# Patient Record
Sex: FEMALE | Race: WHITE | NOT HISPANIC OR LATINO
[De-identification: names, ages, dates, MRNs, and addresses within clinical notes are randomized per-mention and may not be internally consistent; named-entity substitution may affect disease eponyms.]

---

## 2018-03-22 ENCOUNTER — TRANSCRIPTION ENCOUNTER (OUTPATIENT)
Age: 48
End: 2018-03-22

## 2018-07-30 ENCOUNTER — TRANSCRIPTION ENCOUNTER (OUTPATIENT)
Age: 48
End: 2018-07-30

## 2019-07-09 PROBLEM — Z00.00 ENCOUNTER FOR PREVENTIVE HEALTH EXAMINATION: Status: ACTIVE | Noted: 2019-07-09

## 2019-07-19 ENCOUNTER — TRANSCRIPTION ENCOUNTER (OUTPATIENT)
Age: 49
End: 2019-07-19

## 2019-08-06 ENCOUNTER — RESULT REVIEW (OUTPATIENT)
Age: 49
End: 2019-08-06

## 2019-08-06 ENCOUNTER — APPOINTMENT (OUTPATIENT)
Dept: SURGERY | Facility: CLINIC | Age: 49
End: 2019-08-06

## 2019-08-08 ENCOUNTER — APPOINTMENT (OUTPATIENT)
Dept: GASTROENTEROLOGY | Facility: CLINIC | Age: 49
End: 2019-08-08

## 2021-04-16 ENCOUNTER — RESULT REVIEW (OUTPATIENT)
Age: 51
End: 2021-04-16

## 2021-04-26 ENCOUNTER — RESULT REVIEW (OUTPATIENT)
Age: 51
End: 2021-04-26

## 2021-10-12 ENCOUNTER — RESULT REVIEW (OUTPATIENT)
Age: 51
End: 2021-10-12

## 2022-04-21 ENCOUNTER — APPOINTMENT (OUTPATIENT)
Dept: ORTHOPEDIC SURGERY | Facility: CLINIC | Age: 52
End: 2022-04-21

## 2022-05-12 ENCOUNTER — TRANSCRIPTION ENCOUNTER (OUTPATIENT)
Age: 52
End: 2022-05-12

## 2022-05-12 ENCOUNTER — APPOINTMENT (OUTPATIENT)
Dept: ORTHOPEDIC SURGERY | Facility: CLINIC | Age: 52
End: 2022-05-12
Payer: OTHER MISCELLANEOUS

## 2022-05-12 VITALS — BODY MASS INDEX: 27.32 KG/M2 | HEIGHT: 66 IN | WEIGHT: 170 LBS

## 2022-05-12 DIAGNOSIS — Z78.9 OTHER SPECIFIED HEALTH STATUS: ICD-10-CM

## 2022-05-12 DIAGNOSIS — Z87.19 PERSONAL HISTORY OF OTHER DISEASES OF THE DIGESTIVE SYSTEM: ICD-10-CM

## 2022-05-12 DIAGNOSIS — Z86.39 PERSONAL HISTORY OF OTHER ENDOCRINE, NUTRITIONAL AND METABOLIC DISEASE: ICD-10-CM

## 2022-05-12 PROCEDURE — 99455 WORK RELATED DISABILITY EXAM: CPT

## 2022-05-12 PROCEDURE — 99072 ADDL SUPL MATRL&STAF TM PHE: CPT

## 2022-05-14 PROBLEM — Z78.9 NON-SMOKER: Status: ACTIVE | Noted: 2022-05-12

## 2022-05-14 PROBLEM — Z87.19 HISTORY OF GASTROESOPHAGEAL REFLUX (GERD): Status: RESOLVED | Noted: 2022-05-12 | Resolved: 2022-05-14

## 2022-05-14 PROBLEM — Z86.39 HISTORY OF HYPERGLYCEMIA: Status: RESOLVED | Noted: 2022-05-12 | Resolved: 2022-05-14

## 2022-05-14 NOTE — IMAGING
[de-identified] : The patient is a well appearing 51 year old female of their stated age.\par Patient ambulates with a normal gait.\par Negative straight leg raise bilateral\par \par Effected Knee: right \par ROM: 0-120 degrees Goniometer measured 3x\par Lachman: Negative\par Pivot Shift: Negative\par Anterior Drawer: Negative\par Posterior Drawer / Sag:Negative\par Varus Stress 0 degrees: Stable\par Varus Stress 30 degrees: Stable\par Valgus Stress 0 degrees: Stable\par Valgus Stress 30 degrees: Stable\par Medial Amaury: Negative\par Lateral Amaury: Negative\par Patella Glide: 2+\par Patella Apprehension: Negative\par Patella Grind: +\par Palpation:\par Medial Joint Line: tender\par Lateral Joint Line: tender\par Medial Collateral Ligament: Nontender\par Lateral Collateral Ligament/PLC: Nontender\par Distal Femur: Nontender\par Proximal Tibia: Nontender\par Tibial Tubercle: Nontender\par Distal Pole Patella: Nontender\par Quadriceps Tendon: Nontender & Intact\par Patella Tendon: Nontender & Intact\par Medial Distal Hamstring/PES: Nontender\par Lateral Distal Hamstring: Nontender & Stable\par Iliotibial Band: Nontender\par Medial Patellofemoral Ligament: Nontender\par Proximal GSC-Plantaris: Nontender\par Calf: Supple & Nontender\par Inspection:\par Deformity: No\par Erythema: No\par Ecchymosis: No\par Abrasions: No\par Effusion: No\par Prepatella Bursitis: No\par Neurologic Exam:\par Sensation L4-S1: Grossly Intact\par Motor Exam:\par Quadriceps: 5 out of 5\par Hamstrings: 5 out of 5\par EHL: 5 out of 5\par FHL: 5 out of 5\par TA: 5 out of 5\par GS: 5 out of 5\par Circulatory/Pulses:\par Dorsalis Pedis: 2+\par Posterior Tibialis: 2+\par Additional Pertinent Findings: None\par \par \par Effected Knee: left \par ROM: 0-110 degrees Goniometer measured 3x\par Lachman: Negative\par Pivot Shift: Negative\par Anterior Drawer: Negative\par Posterior Drawer / Sag:Negative\par Varus Stress 0 degrees: Stable\par Varus Stress 30 degrees: Stable\par Valgus Stress 0 degrees: Stable\par Valgus Stress 30 degrees: Stable\par Medial Amaury: Negative\par Lateral Amaury: Negative\par Patella Glide: 2+\par Patella Apprehension: Negative\par Patella Grind: +\par Palpation:\par Medial Joint Line: tender\par Lateral Joint Line: tender\par Medial Collateral Ligament: Nontender\par Lateral Collateral Ligament/PLC: Nontender\par Distal Femur: Nontender\par Proximal Tibia: Nontender\par Tibial Tubercle: Nontender\par Distal Pole Patella: Nontender\par Quadriceps Tendon: Nontender & Intact\par Patella Tendon: Nontender & Intact\par Medial Distal Hamstring/PES: Nontender\par Lateral Distal Hamstring: Nontender & Stable\par Iliotibial Band: Nontender\par Medial Patellofemoral Ligament: Nontender\par Proximal GSC-Plantaris: Nontender\par Calf: Supple & Nontender\par Inspection:\par Deformity: No\par Erythema: No\par Ecchymosis: No\par Abrasions: No\par Effusion: No\par Prepatella Bursitis: No\par Neurologic Exam:\par Sensation L4-S1: Grossly Intact\par Motor Exam:\par Quadriceps: 4 out of 5\par Hamstrings: 5 out of 5\par EHL: 5 out of 5\par FHL: 5 out of 5\par TA: 5 out of 5\par GS: 5 out of 5\par Circulatory/Pulses:\par Dorsalis Pedis: 2+\par Posterior Tibialis: 2+\par Additional Pertinent Findings: None\par   \par \par Assessment: The patient is a 51 year old with b/l knee OA\par \par Patients condition is chronic\par Pertinent findings include: Right knee: 0-120, crepitus, +PF grind, MJLT, LJLT, 5/5 quad -- Left knee: 0-110, crepitus, +PF grind, MJLT, LJLT, 4/5 quad\par Confounding medical conditions/concerns: GERD, pure hyperglyceridemia\par \par Plan: Patient has reached maximum medical benefit and based on the [place] 2018 WC guidelines, schedule loss of use is: Right knee 15%, and left knee 20%.\par Activity/Work/Sports Status: Patient remains out of work\par Additional Instructions: Will continue HEP and ice.\par Follow-Up: prn\par \par The patient's current medication management of their orthopedic diagnosis was reviewed today:\par (1) We discussed a comprehensive treatment plan that included possible pharmaceutical management involving the use of prescription strength medications including but not limited to options such as oral Naprosyn 500mg BID, once daily Meloxicam 15 mg, or 500-650 mg Tylenol versus over the counter oral medications and topical prescription NSAID Pennsaid vs over the counter Voltaren gel.\par (2) There is a moderate risk of morbidity with further treatment, especially from use of prescription strength medications and possible side effects of these medications which include upset stomach with oral medications, skin reactions to topical medications and cardiac/renal issues with long term use.\par (3) I recommended that the patient follow-up with their medical physician to discuss any significant specific potential issues with long term medication use such as interactions with current medications or with exacerbation of underlying medical comorbidities.\par (4) The benefits and risks associated with use of injectable, oral or topical, prescription and over the counter anti-inflammatory medications were discussed with the patient. The patient voiced understanding of the risks including but not limited to bleeding, stroke, kidney dysfunction, heart disease, and were referred to the black box warning label for further information.\par \par I, Hank Wild, attest that this documentation has been prepared under the direction and in the presence of Provider Dr. Guthrie\par \par The documentation recorded by the scribe accurately reflects the service I personally performed and the decisions made by me.\par

## 2022-05-14 NOTE — WORK
[Has the patient reached Maximum Medical Improvement? If yes, indicate date___] : Yes, on [unfilled] [Is there permanent partial impairment?] : Yes [FreeTextEntry6] : Bilateral Knees [Right] : right [Left] : left [FreeTextEntry7] : Knee [FreeTextEntry8] : 0-120 [de-identified] : OA [FreeTextEntry5] : 15% [de-identified] : Loss of motion and post traumatic OA [de-identified] : Knee [de-identified] : 0-110 [de-identified] : OA [de-identified] : 20% [de-identified] : Loss of motion and post traumatic OA [Not working] : Not working [___lbs] : Occasionally: [unfilled] lbs [] : Constantly [Light Work] : Light work [N/A] : N/A [Would the patient benefit from vocational rehabilitation? If Yes, explain below.] :  No

## 2022-05-14 NOTE — HISTORY OF PRESENT ILLNESS
[de-identified] : The patient is a 50 year old Right hand dominant female who presents today complaining of both knees and scheduled loss of use. \par Date of Injury/Onset: 4/11/19\par Pain: At Rest: 5/10 \par With Activity: 7/10 \par Mechanism of injury: Fell at work.\par This is a Work Related Injury being treated under Worker's Compensation.\par This is not an athletic injury occurring associated with an interscholastic or organized sports team.\par Quality of symptoms: Sharp pain, beginning to notice back pain, knee locks up.\par Improves with: Naproxen, ice \par Worse with: Walking, stairs, increased activity, bending down. \par Treatment/Imaging/Studies Since Last Visit: Home exercises. \par 	Reports Available For Review Today: None \par Out of work/sport: Yes, since 4/11/2019\par School/Sport/Position/Occupation: [employer] \par Change since last visit: Patient states she has not experienced any improvement since last visit.

## 2022-05-15 ENCOUNTER — RESULT REVIEW (OUTPATIENT)
Age: 52
End: 2022-05-15

## 2022-05-16 ENCOUNTER — APPOINTMENT (OUTPATIENT)
Dept: OBGYN | Facility: CLINIC | Age: 52
End: 2022-05-16
Payer: SELF-PAY

## 2022-05-16 PROCEDURE — 99396 PREV VISIT EST AGE 40-64: CPT | Mod: 25

## 2022-05-16 PROCEDURE — 81002 URINALYSIS NONAUTO W/O SCOPE: CPT

## 2022-05-16 PROCEDURE — 82270 OCCULT BLOOD FECES: CPT

## 2022-05-16 PROCEDURE — 99072 ADDL SUPL MATRL&STAF TM PHE: CPT

## 2022-12-30 ENCOUNTER — APPOINTMENT (OUTPATIENT)
Dept: ORTHOPEDIC SURGERY | Facility: CLINIC | Age: 52
End: 2022-12-30
Payer: OTHER MISCELLANEOUS

## 2022-12-30 VITALS — BODY MASS INDEX: 27.32 KG/M2 | HEIGHT: 66 IN | WEIGHT: 170 LBS

## 2022-12-30 PROCEDURE — 73564 X-RAY EXAM KNEE 4 OR MORE: CPT | Mod: RT

## 2022-12-30 PROCEDURE — 99214 OFFICE O/P EST MOD 30 MIN: CPT

## 2022-12-30 PROCEDURE — 99072 ADDL SUPL MATRL&STAF TM PHE: CPT

## 2023-01-12 ENCOUNTER — APPOINTMENT (OUTPATIENT)
Dept: ORTHOPEDIC SURGERY | Facility: CLINIC | Age: 53
End: 2023-01-12

## 2023-01-13 ENCOUNTER — APPOINTMENT (OUTPATIENT)
Dept: ORTHOPEDIC SURGERY | Facility: CLINIC | Age: 53
End: 2023-01-13
Payer: OTHER MISCELLANEOUS

## 2023-01-13 VITALS — HEIGHT: 66 IN | WEIGHT: 170 LBS | BODY MASS INDEX: 27.32 KG/M2

## 2023-01-13 DIAGNOSIS — M17.31 UNILATERAL POST-TRAUMATIC OSTEOARTHRITIS, RIGHT KNEE: ICD-10-CM

## 2023-01-13 DIAGNOSIS — M17.0 BILATERAL PRIMARY OSTEOARTHRITIS OF KNEE: ICD-10-CM

## 2023-01-13 DIAGNOSIS — M25.561 PAIN IN RIGHT KNEE: ICD-10-CM

## 2023-01-13 PROCEDURE — 99072 ADDL SUPL MATRL&STAF TM PHE: CPT | Mod: NC

## 2023-01-13 PROCEDURE — 99214 OFFICE O/P EST MOD 30 MIN: CPT | Mod: 25

## 2023-01-13 PROCEDURE — 20611 DRAIN/INJ JOINT/BURSA W/US: CPT | Mod: RT

## 2023-02-01 ENCOUNTER — RX RENEWAL (OUTPATIENT)
Age: 53
End: 2023-02-01

## 2023-02-01 RX ORDER — NAPROXEN 500 MG/1
500 TABLET ORAL
Qty: 60 | Refills: 0 | Status: ACTIVE | COMMUNITY
Start: 2022-12-30 | End: 1900-01-01

## 2023-03-08 ENCOUNTER — FORM ENCOUNTER (OUTPATIENT)
Age: 53
End: 2023-03-08

## 2023-04-27 NOTE — IMAGING
[Right] : right knee [All Views] : anteroposterior, lateral, skyline, and anteroposterior standing [FreeTextEntry9] :  X-Ray right knee reveals reveals evidence of tricompartment OA most severe with significant joint space narrowing on the lateral aspect  [de-identified] : The patient is a well appearing 51 year old female of their stated age.\par Patient ambulates with a normal gait.\par Negative straight leg raise bilateral\par \par Effected Knee: RIGHT \par ROM: 0-120 degrees Goniometer measured 3x\par Lachman: Negative\par Pivot Shift: Negative\par Anterior Drawer: Negative\par Posterior Drawer / Sag:Negative\par Varus Stress 0 degrees: Stable\par Varus Stress 30 degrees: Stable\par Valgus Stress 0 degrees: Stable\par Valgus Stress 30 degrees: Stable\par Medial Amaury: Negative\par Lateral Amaury: Negative\par Patella Glide: 2+\par Patella Apprehension: Negative\par Patella Grind: +\par Palpation:\par Medial Joint Line: tender\par Lateral Joint Line: tender\par Medial Collateral Ligament: Nontender\par Lateral Collateral Ligament/PLC: Nontender\par Distal Femur: Nontender\par Proximal Tibia: Nontender\par Tibial Tubercle: Nontender\par Distal Pole Patella: Nontender\par Quadriceps Tendon: Nontender & Intact\par Patella Tendon: Nontender & Intact\par Medial Distal Hamstring/PES: Nontender\par Lateral Distal Hamstring: Nontender & Stable\par Iliotibial Band: Nontender\par Medial Patellofemoral Ligament: Nontender\par Proximal GSC-Plantaris: Nontender\par Calf: Supple & Nontender\par Inspection:\par Deformity: No\par Erythema: No\par Ecchymosis: No\par Abrasions: No\par Effusion: No\par Prepatella Bursitis: No\par Neurologic Exam:\par Sensation L4-S1: Grossly Intact\par Motor Exam:\par Quadriceps: 5 out of 5\par Hamstrings: 5 out of 5\par EHL: 5 out of 5\par FHL: 5 out of 5\par TA: 5 out of 5\par GS: 5 out of 5\par Circulatory/Pulses:\par Dorsalis Pedis: 2+\par Posterior Tibialis: 2+\par Additional Pertinent Findings: None\par   \par Assessment: The patient is a 51 year old with with right knee pain and radiographic and physical exam findings consistent with post traumatic OA. \par \par The patient’s condition is chronic \par Documents/Results Reviewed Today: None \par Tests/Studies Independently Interpreted Today: None\par Pertinent findings include: Right knee: 0-120, crepitus, +PF grind, MJLT, LJLT, 5/5 quad \par Confounding medical conditions/concerns: GERD, pure hyperglyceridemia\par \par Plan: Discussed conservative treatments in the form of injections. Patient received right knee GelOne injection today. Advised patient to obtain Reparel knee sleeve.  Modify activity as discussed. Patient is not working at this time. \par Tests Ordered: None \par Prescription Medications Ordered: None\par Braces/DME Ordered: None\par Activity/Work/Sports: N/a\par Additional Instructions: none\par Follow-Up: 6 months \par \par Procedure Note: Musculoskeletal Injection   \par Diagnosis: right knee OA \par Procedure: right knee, superolateral, Gel One\par \par Indication:  The patient has had persistent pain despite conservative treatment.  Risks, benefits and alternatives to procedure were discussed; all questions were answered to the patient's apparent satisfaction and informed consent obtained.  Consent form was signed and dated today.  The patient denied prior problems with local anesthetics, injectable cortisones, chicken allergy, coagulopathy and no relevant drug or preservative allergies or sensitivities. \par The area of injection was prepared in a sterile fashion.  Prior to injection a 'Time Out' was conducted in accordance with University policy and the site and nature of procedure verified with the patient. \par   \par Procedure: \par The injection and aspiration was carried out utilizing sterile technique from a superolateral arthroscopic portal position with needle placement under ultrasound guidance to improve accuracy and minimize risk to the patient and: \par  \par (X) Diagnositic ultrasound in the long and short axis revealed right knee OA\par \par 12cc of clear synovial fluid was aspirated. \par The specimen:\par (X) appeared benign and was discarded \par ( ) was sent for Culture / Cell Count / Crystal analysis / [_]. \par   \par Injection into the target area with care taken to aspirate frequently to minimize the risk of intravascular injection was performed with: \par   \par ( ) 1cc of Depomedrol (80mg/ml) \par ( ) 1cc of Dexamethasone (10mg/ml) \par ( ) 1cc of Toradol (30mg/ml) \par ( ) 9cc of 0.5% Bupivacaine \par (X) 1cc of 1% Lidocaine \par ( ) 5cc of 32mg Zilretta, prepared and diluted per  instructions \par ( ) 2 cc of Hylan G-F 20 (Synvisc) 16mg/2ml \par ( ) 6 cc of Hylan G-F 20 (SynvisoOne) 16mg/2ml \par ( ) 2cc of Euflexxa \par ( ) 2cc of Orthovisc \par (X) 2cc of GelOne \par ( ) 3cc of Durolane (20mg/ml) \par \par \par Patient tolerated the procedure well and direct pressure was applied for hemostasis. The patient was reminded of potential post-injection risks including, but not limited to, delayed hypersensitivity reactions and/or infection.  The patient verified that they had the office and the Emergency Room's contact information if any problems should arise.  After several minutes, the patient informed me that they felt fine and was released from the office.\par \par \par The patient's current medication management of their orthopedic diagnosis was reviewed today:\par (1) We discussed a comprehensive treatment plan that included possible pharmaceutical management involving the use of prescription strength medications including but not limited to options such as oral Naprosyn 500mg BID, once daily Meloxicam 15 mg, or 500-650 mg Tylenol versus over the counter oral medications and topical prescription NSAID Pennsaid vs over the counter Voltaren gel.\par (2) There is a moderate risk of morbidity with further treatment, especially from use of prescription strength medications and possible side effects of these medications which include upset stomach with oral medications, skin reactions to topical medications and cardiac/renal issues with long term use.\par (3) I recommended that the patient follow-up with their medical physician to discuss any significant specific potential issues with long term medication use such as interactions with current medications or with exacerbation of underlying medical comorbidities.\par (4) The benefits and risks associated with use of injectable, oral or topical, prescription and over the counter anti-inflammatory medications were discussed with the patient. The patient voiced understanding of the risks including but not limited to bleeding, stroke, kidney dysfunction, heart disease, and were referred to the black box warning label for further information.\par \par I, January Rivera, attest that this documentation has been prepared under the direction and in the presence of Provider Dr. Cole Guthrie.\par \par \par The documentation recorded by the scribe accurately reflects the service I personally performed and the decisions made by me.\par The patient was seen by me under the direct supervision of Dr. Cole Guthrie\par

## 2023-04-27 NOTE — WORK
[Other: ___] : [unfilled] [Was the competent medical cause of the injury] : was the competent medical cause of the injury [Are consistent with the injury] : are consistent with the injury [Consistent with my objective findings] : consistent with my objective findings [Total] : total [Does not reveal pre-existing condition(s) that may affect treatment/prognosis] : does not reveal pre-existing condition(s) that may affect treatment/prognosis [Patient] : patient [No Rx restrictions] : No Rx restrictions. [I provided the services listed above] :  I provided the services listed above.

## 2023-04-27 NOTE — IMAGING
[Right] : right knee [All Views] : anteroposterior, lateral, skyline, and anteroposterior standing [de-identified] : The patient is a well appearing 51 year old female of their stated age.\par Patient ambulates with a normal gait.\par Negative straight leg raise bilateral\par \par Effected Knee: RIGHT \par ROM: 0-120 degrees Goniometer measured 3x\par Lachman: Negative\par Pivot Shift: Negative\par Anterior Drawer: Negative\par Posterior Drawer / Sag:Negative\par Varus Stress 0 degrees: Stable\par Varus Stress 30 degrees: Stable\par Valgus Stress 0 degrees: Stable\par Valgus Stress 30 degrees: Stable\par Medial Amaury: Negative\par Lateral Amaury: Negative\par Patella Glide: 2+\par Patella Apprehension: Negative\par Patella Grind: +\par Palpation:\par Medial Joint Line: tender\par Lateral Joint Line: tender\par Medial Collateral Ligament: Nontender\par Lateral Collateral Ligament/PLC: Nontender\par Distal Femur: Nontender\par Proximal Tibia: Nontender\par Tibial Tubercle: Nontender\par Distal Pole Patella: Nontender\par Quadriceps Tendon: Nontender & Intact\par Patella Tendon: Nontender & Intact\par Medial Distal Hamstring/PES: Nontender\par Lateral Distal Hamstring: Nontender & Stable\par Iliotibial Band: Nontender\par Medial Patellofemoral Ligament: Nontender\par Proximal GSC-Plantaris: Nontender\par Calf: Supple & Nontender\par Inspection:\par Deformity: No\par Erythema: No\par Ecchymosis: No\par Abrasions: No\par Effusion: No\par Prepatella Bursitis: No\par Neurologic Exam:\par Sensation L4-S1: Grossly Intact\par Motor Exam:\par Quadriceps: 5 out of 5\par Hamstrings: 5 out of 5\par EHL: 5 out of 5\par FHL: 5 out of 5\par TA: 5 out of 5\par GS: 5 out of 5\par Circulatory/Pulses:\par Dorsalis Pedis: 2+\par Posterior Tibialis: 2+\par Additional Pertinent Findings: None\par   \par Assessment: The patient is a 51 year old with with right knee pain and radiographic and physical exam findings consistent with post traumatic OA. \par \par The patient’s condition is chronic \par Documents/Results Reviewed Today: X-Ray right knee \par Tests/Studies Independently Interpreted Today: X-Ray right knee reveals reveals evidence of tricompartment OA most severe with significant joint space narrowing on the lateral aspect \par \par Pertinent findings include: Right knee: 0-120, crepitus, +PF grind, MJLT, LJLT, 5/5 quad \par Confounding medical conditions/concerns: GERD, pure hyperglyceridemia\par \par Plan: Discussed conservative treatments in the form of injections. Patient reports significant improvement with previous Gel-one injection. Will await authorization for right knee Gel-one injection. Advised patient to obtain Reparel knee sleeve. Prescribed patient Naproxen for pain management and pain management. Modify activity as discussed. Patient is out of work.\par Tests Ordered: Gel-one injection \par Prescription Medications Ordered: Naproxen \par Braces/DME Ordered: None\par Activity/Work/Sports: N/a\par Additional Instructions: none\par Follow-Up: For Gel-one injection \par \par The patient's current medication management of their orthopedic diagnosis was reviewed today:\par (1) We discussed a comprehensive treatment plan that included possible pharmaceutical management involving the use of prescription strength medications including but not limited to options such as oral Naprosyn 500mg BID, once daily Meloxicam 15 mg, or 500-650 mg Tylenol versus over the counter oral medications and topical prescription NSAID Pennsaid vs over the counter Voltaren gel.\par (2) There is a moderate risk of morbidity with further treatment, especially from use of prescription strength medications and possible side effects of these medications which include upset stomach with oral medications, skin reactions to topical medications and cardiac/renal issues with long term use.\par (3) I recommended that the patient follow-up with their medical physician to discuss any significant specific potential issues with long term medication use such as interactions with current medications or with exacerbation of underlying medical comorbidities.\par (4) The benefits and risks associated with use of injectable, oral or topical, prescription and over the counter anti-inflammatory medications were discussed with the patient. The patient voiced understanding of the risks including but not limited to bleeding, stroke, kidney dysfunction, heart disease, and were referred to the black box warning label for further information.\par \par Tejal MENDEZ attest that this documentation has been prepared under the direction and in the presence of Provider Dr. Cole Guthrie. \par \par The documentation recorded by the scribe accurately reflects the service I personally performed and the decisions made by me.\par The patient was seen by me under the direct supervision of Dr. Cole Guthrie\par  [FreeTextEntry9] :  X-Ray right knee reveals reveals evidence of tricompartment OA most severe with significant joint space narrowing on the lateral aspect

## 2023-04-27 NOTE — HISTORY OF PRESENT ILLNESS
[de-identified] : The patient is a 52 year old right hand dominant female who presents today complaining of right knee pain and possible inj therapy. \par Date of Injury/Onset: 4/11/19\par Pain: At Rest: 6/10 \par With Activity: 6/10 \par Mechanism of injury: Fell at work.\par This is a Work Related Injury being treated under Worker's Compensation.\par This is not an athletic injury occurring associated with an interscholastic or organized sports team.\par Quality of symptoms: Sharp pain, beginning to notice back pain, knee locks up.\par Improves with: ice, elevation \par Worse with: stairs, squatting, walking. \par Treatment/Imaging/Studies Since Last Visit: None \par 	Reports Available For Review Today: None \par Out of work/sport: Yes, since 4/11/2019\par School/Sport/Position/Occupation: [employer] \par Change since last visit: Seen for bilateral knee osteoarthritis in the past, has gotten gel injections. \par

## 2023-04-27 NOTE — HISTORY OF PRESENT ILLNESS
[de-identified] : The patient is a 52 year old right hand dominant female who presents today complaining of right knee pain. \par Date of Injury/Onset: 4/11/19\par Pain: At Rest: 7/10 \par With Activity: 10/10 \par Mechanism of injury: Fell at work.\par This is a Work Related Injury being treated under Worker's Compensation.\par This is not an athletic injury occurring associated with an interscholastic or organized sports team.\par Quality of symptoms: Sharp pain, beginning to notice back pain, knee locks up.\par Improves with: ice, elevation \par Worse with: stairs, squatting, walking. \par Treatment/Imaging/Studies Since Last Visit: None \par 	Reports Available For Review Today: None \par Out of work/sport: Yes, since 4/11/2019\par School/Sport/Position/Occupation: [employer] \par Change since last visit: Seen for bilateral knee osteoarthritis in the past, has gotten gel injections. \par